# Patient Record
Sex: MALE | Race: BLACK OR AFRICAN AMERICAN
[De-identification: names, ages, dates, MRNs, and addresses within clinical notes are randomized per-mention and may not be internally consistent; named-entity substitution may affect disease eponyms.]

---

## 2020-01-17 ENCOUNTER — HOSPITAL ENCOUNTER (OUTPATIENT)
Dept: HOSPITAL 61 - KCIC | Age: 20
Discharge: HOME | End: 2020-01-17
Attending: FAMILY MEDICINE
Payer: COMMERCIAL

## 2020-01-17 DIAGNOSIS — M79.89: Primary | ICD-10-CM

## 2020-01-17 PROCEDURE — 73140 X-RAY EXAM OF FINGER(S): CPT

## 2020-01-17 NOTE — [facility]
PROCEDURE: FINGER(S) LEFT

 

STUDY DATE: 1/17/2020

 

CLINICAL INDICATION / HISTORY: PIP dislocation with decreased range of 

motion and swelling..

 

TECHNIQUE: Right  fifth finger - 3 Views: PA, oblique, and lateral views 

were obtained.

 

 

COMPARISON: None

 

FINDINGS: No fracture, dislocation, or other acute osseous abnormality is 

confidently identified. There is a tiny curvilinear density on the volar 

aspect of the proximal phalangeal metaphysis at the PIP joint that could 

be artifact. A tiny avulsion fracture is considered unlikely. There is 

mild soft tissue swelling around the PIP joint.

 

IMPRESSION: Right fifth finger. No definite acute osseous abnormality is 

noted. If indicated, further imaging by MRI could be pursued.

 

Electronically signed by: Floyd Hernandez MD (1/17/2020 2:09 PM) 

[facility]